# Patient Record
Sex: MALE | Race: WHITE | NOT HISPANIC OR LATINO | URBAN - METROPOLITAN AREA
[De-identification: names, ages, dates, MRNs, and addresses within clinical notes are randomized per-mention and may not be internally consistent; named-entity substitution may affect disease eponyms.]

---

## 2017-11-01 ENCOUNTER — OUTPATIENT (OUTPATIENT)
Dept: OUTPATIENT SERVICES | Facility: HOSPITAL | Age: 82
LOS: 1 days | Discharge: HOME | End: 2017-11-01
Payer: MEDICARE

## 2017-11-01 VITALS
TEMPERATURE: 98 F | DIASTOLIC BLOOD PRESSURE: 69 MMHG | HEART RATE: 67 BPM | WEIGHT: 162.92 LBS | SYSTOLIC BLOOD PRESSURE: 101 MMHG | HEIGHT: 67 IN | OXYGEN SATURATION: 96 % | RESPIRATION RATE: 18 BRPM

## 2017-11-01 DIAGNOSIS — Z01.818 ENCOUNTER FOR OTHER PREPROCEDURAL EXAMINATION: ICD-10-CM

## 2017-11-01 DIAGNOSIS — Z92.89 PERSONAL HISTORY OF OTHER MEDICAL TREATMENT: Chronic | ICD-10-CM

## 2017-11-01 DIAGNOSIS — M16.12 UNILATERAL PRIMARY OSTEOARTHRITIS, LEFT HIP: ICD-10-CM

## 2017-11-01 DIAGNOSIS — Z96.641 PRESENCE OF RIGHT ARTIFICIAL HIP JOINT: Chronic | ICD-10-CM

## 2017-11-01 DIAGNOSIS — M25.559 PAIN IN UNSPECIFIED HIP: ICD-10-CM

## 2017-11-01 LAB
ANION GAP SERPL CALC-SCNC: 9 MMOL/L — SIGNIFICANT CHANGE UP (ref 5–17)
APTT BLD: 27 SEC — LOW (ref 27.5–37.4)
BASOPHILS # BLD AUTO: 0.1 K/UL — SIGNIFICANT CHANGE UP (ref 0–0.2)
BASOPHILS NFR BLD AUTO: 1.6 % — SIGNIFICANT CHANGE UP (ref 0–2)
BUN SERPL-MCNC: 22 MG/DL — SIGNIFICANT CHANGE UP (ref 7–23)
CALCIUM SERPL-MCNC: 8.5 MG/DL — SIGNIFICANT CHANGE UP (ref 8.5–10.1)
CHLORIDE SERPL-SCNC: 108 MMOL/L — SIGNIFICANT CHANGE UP (ref 96–108)
CO2 SERPL-SCNC: 25 MMOL/L — SIGNIFICANT CHANGE UP (ref 22–31)
CREAT SERPL-MCNC: 1.09 MG/DL — SIGNIFICANT CHANGE UP (ref 0.5–1.3)
EOSINOPHIL # BLD AUTO: 0.2 K/UL — SIGNIFICANT CHANGE UP (ref 0–0.5)
EOSINOPHIL NFR BLD AUTO: 3.3 % — SIGNIFICANT CHANGE UP (ref 0–6)
GLUCOSE SERPL-MCNC: 98 MG/DL — SIGNIFICANT CHANGE UP (ref 70–99)
HBA1C BLD-MCNC: 5.8 % — HIGH (ref 4–5.6)
HCT VFR BLD CALC: 39.8 % — SIGNIFICANT CHANGE UP (ref 39–50)
HGB BLD-MCNC: 13.4 G/DL — SIGNIFICANT CHANGE UP (ref 13–17)
INR BLD: 0.93 RATIO — SIGNIFICANT CHANGE UP (ref 0.88–1.16)
LYMPHOCYTES # BLD AUTO: 0.9 K/UL — LOW (ref 1–3.3)
LYMPHOCYTES # BLD AUTO: 15.9 % — SIGNIFICANT CHANGE UP (ref 13–44)
MCHC RBC-ENTMCNC: 31.4 PG — SIGNIFICANT CHANGE UP (ref 27–34)
MCHC RBC-ENTMCNC: 33.6 GM/DL — SIGNIFICANT CHANGE UP (ref 32–36)
MCV RBC AUTO: 93.5 FL — SIGNIFICANT CHANGE UP (ref 80–100)
MONOCYTES # BLD AUTO: 0.5 K/UL — SIGNIFICANT CHANGE UP (ref 0–0.9)
MONOCYTES NFR BLD AUTO: 9.1 % — SIGNIFICANT CHANGE UP (ref 2–14)
MRSA PCR RESULT.: SIGNIFICANT CHANGE UP
NEUTROPHILS # BLD AUTO: 4.1 K/UL — SIGNIFICANT CHANGE UP (ref 1.8–7.4)
NEUTROPHILS NFR BLD AUTO: 70.2 % — SIGNIFICANT CHANGE UP (ref 43–77)
PLATELET # BLD AUTO: 238 K/UL — SIGNIFICANT CHANGE UP (ref 150–400)
POTASSIUM SERPL-MCNC: 3.7 MMOL/L — SIGNIFICANT CHANGE UP (ref 3.5–5.3)
POTASSIUM SERPL-SCNC: 3.7 MMOL/L — SIGNIFICANT CHANGE UP (ref 3.5–5.3)
PROTHROM AB SERPL-ACNC: 10.1 SEC — SIGNIFICANT CHANGE UP (ref 9.8–12.7)
RBC # BLD: 4.26 M/UL — SIGNIFICANT CHANGE UP (ref 4.2–5.8)
RBC # FLD: 14.1 % — SIGNIFICANT CHANGE UP (ref 11–15)
S AUREUS DNA NOSE QL NAA+PROBE: SIGNIFICANT CHANGE UP
SODIUM SERPL-SCNC: 142 MMOL/L — SIGNIFICANT CHANGE UP (ref 135–145)
WBC # BLD: 5.9 K/UL — SIGNIFICANT CHANGE UP (ref 3.8–10.5)
WBC # FLD AUTO: 5.9 K/UL — SIGNIFICANT CHANGE UP (ref 3.8–10.5)

## 2017-11-01 PROCEDURE — 93010 ELECTROCARDIOGRAM REPORT: CPT | Mod: NC

## 2017-11-01 NOTE — PHYSICAL THERAPY INITIAL EVALUATION ADULT - CRITERIA FOR SKILLED THERAPEUTIC INTERVENTIONS
impairments found/functional limitations in following categories/anticipated equipment needs at discharge/risk reduction/prevention/anticipated discharge recommendation/rehab potential/:CI,:CH,:CI

## 2017-11-01 NOTE — OCCUPATIONAL THERAPY INITIAL EVALUATION ADULT - MODIFIED CLINICAL TEST OF SENSORY INTEGRATION IN BALANCE TEST
Barthel Index: Feeding Score___10___, Bathing Score___5___, Grooming Score___5__, Dressing Score_10____, Bowel Score__10___, Bladder Score__10____, Toilet Score__10___, Transfer Score__15____, Mobility Score__15___, Stairs Score__10___, Total Score__100___.

## 2017-11-01 NOTE — PHYSICAL THERAPY INITIAL EVALUATION ADULT - GENERAL OBSERVATIONS, REHAB EVAL
Patient encountered sitting in [facility] waiting area, agreeable to PT pre-op evaluation. Patient is for elective left total hip arthroplasty at a later date from now.

## 2017-11-01 NOTE — PHYSICAL THERAPY INITIAL EVALUATION ADULT - PLANNED THERAPY INTERVENTIONS, PT EVAL
neuromuscular re-education/balance training/ROM/postural re-education/stretching/gait training/motor coordination training/transfer training/strengthening

## 2017-11-01 NOTE — PHYSICAL THERAPY INITIAL EVALUATION ADULT - ADDITIONAL COMMENTS
As per patient, lives c spouse in private house c 11 stair steps to enter from street, left handrail ascending. Level anahi internally. Has 3 bathrooms, all with toilets. 2 bathrooms with regular toilet seat and shower/tub combination. Master bathroom with raised toilet seat and walk-in shower. Uses a cane but owns a commode, rolling walker from previous hip surgery to right. Drives a sedan-type vehicle.

## 2017-11-01 NOTE — OCCUPATIONAL THERAPY INITIAL EVALUATION ADULT - SOCIAL CONCERNS
None/As per patient "My wife will be able to assist me after the surgery with any of my daily tasks and needs."

## 2017-11-01 NOTE — PHYSICAL THERAPY INITIAL EVALUATION ADULT - MODIFIED CLINICAL TEST OF SENSORY INTEGRATION IN BALANCE TEST
5x Sit to Stand Test = (hands use) 16 seconds, indicating significant impairment c functional mobility & strength  ; 2 Minute Walk Test = 250 feet without devices or rest stops, Lloyd RPE 5/10, measured for baseline recording

## 2017-11-01 NOTE — OCCUPATIONAL THERAPY INITIAL EVALUATION ADULT - GENERAL OBSERVATIONS, REHAB EVAL
Chart reviewed. Patient encountered seated in recliner chair in [facility] waiting area with NAD. Patient underwent occupational therapy pre-operative consultation to determine current functional ADL limitations in order to provice the right equipment for patient to perform functional ADLS post operation.

## 2017-11-01 NOTE — PHYSICAL THERAPY INITIAL EVALUATION ADULT - IMPAIRMENTS FOUND, PT EVAL
joint integrity and mobility/aerobic capacity/endurance/ergonomics and body mechanics/gait, locomotion, and balance/posture/muscle strength/ROM

## 2017-11-01 NOTE — OCCUPATIONAL THERAPY INITIAL EVALUATION ADULT - FINE MOTOR COORDINATION, FINE MOTOR COORDINATION TESTS, OT EVAL
Patient specific functional scale: Patient specific scoring scheme: 0 (unable to perform activity) ---- 5 ---- 10 (Able to perform activity at the same level as before injury or problem.)Activity: Walking ____8___

## 2017-11-01 NOTE — PHYSICAL THERAPY INITIAL EVALUATION ADULT - PERTINENT HX OF CURRENT PROBLEM, REHAB EVAL
Presents to PT for elective (L) THR. Aware of THR precautions form previous (R) THR. Noted antalgic gait. Reports gets 8/10 pain at worse on left hip when walked a while. Currently 0/10 pain at rest.

## 2017-11-01 NOTE — H&P PST ADULT - PMH
HTN (hypertension)    Hyperlipidemia    MI (myocardial infarction)  1987 HTN (hypertension)    Hyperlipidemia    Hypothyroid    MI (myocardial infarction)  1987

## 2017-11-01 NOTE — OCCUPATIONAL THERAPY INITIAL EVALUATION ADULT - ADDITIONAL COMMENTS
Patient lives in a private house with a garage entrance. Once inside of the house, the patient has 11 steps with a railing on the L side to reach the main floor where the bedroom and bathroom. The patients bathroom has a tub/shower combination with a regular toilet. The patient has a 3/1 commode and no other devices inside of the bathroom. The patient ambulates with cane for balance. The patient owns a rolling walker as well. The patient drives. The patients daily pain level is a 8/10 and the patient does not use pain medication.

## 2017-11-01 NOTE — PHYSICAL THERAPY INITIAL EVALUATION ADULT - ACTIVE RANGE OF MOTION EXAMINATION, REHAB EVAL
Right Hip Ext-Flex = 0-90, Right Hip Add-Abd = 0-20 degrees; Left Hip Ext-Flex = 0-90, Left Hip Add-Abd 0-15 degrees; left knee annamarie with right straight leg raise

## 2017-11-10 RX ORDER — CELECOXIB 200 MG/1
200 CAPSULE ORAL ONCE
Qty: 0 | Refills: 0 | Status: COMPLETED | OUTPATIENT
Start: 2017-11-13 | End: 2017-11-13

## 2017-11-10 RX ORDER — ACETAMINOPHEN 500 MG
650 TABLET ORAL ONCE
Qty: 0 | Refills: 0 | Status: COMPLETED | OUTPATIENT
Start: 2017-11-13 | End: 2017-11-13

## 2017-11-10 RX ORDER — SODIUM CHLORIDE 9 MG/ML
3 INJECTION INTRAMUSCULAR; INTRAVENOUS; SUBCUTANEOUS EVERY 8 HOURS
Qty: 0 | Refills: 0 | Status: DISCONTINUED | OUTPATIENT
Start: 2017-11-13 | End: 2017-11-14

## 2017-11-13 ENCOUNTER — INPATIENT (INPATIENT)
Facility: HOSPITAL | Age: 82
LOS: 0 days | Discharge: HOME | End: 2017-11-14
Attending: ORTHOPAEDIC SURGERY | Admitting: ORTHOPAEDIC SURGERY
Payer: MEDICARE

## 2017-11-13 VITALS
SYSTOLIC BLOOD PRESSURE: 119 MMHG | OXYGEN SATURATION: 97 % | RESPIRATION RATE: 17 BRPM | HEART RATE: 62 BPM | TEMPERATURE: 97 F | DIASTOLIC BLOOD PRESSURE: 72 MMHG | HEIGHT: 67 IN | WEIGHT: 154.98 LBS

## 2017-11-13 DIAGNOSIS — E03.9 HYPOTHYROIDISM, UNSPECIFIED: ICD-10-CM

## 2017-11-13 DIAGNOSIS — M16.12 UNILATERAL PRIMARY OSTEOARTHRITIS, LEFT HIP: ICD-10-CM

## 2017-11-13 DIAGNOSIS — I10 ESSENTIAL (PRIMARY) HYPERTENSION: ICD-10-CM

## 2017-11-13 DIAGNOSIS — Z96.641 PRESENCE OF RIGHT ARTIFICIAL HIP JOINT: Chronic | ICD-10-CM

## 2017-11-13 DIAGNOSIS — Z92.89 PERSONAL HISTORY OF OTHER MEDICAL TREATMENT: Chronic | ICD-10-CM

## 2017-11-13 DIAGNOSIS — I25.10 ATHEROSCLEROTIC HEART DISEASE OF NATIVE CORONARY ARTERY WITHOUT ANGINA PECTORIS: ICD-10-CM

## 2017-11-13 LAB
ANION GAP SERPL CALC-SCNC: 8 MMOL/L — SIGNIFICANT CHANGE UP (ref 5–17)
APTT BLD: 26.7 SEC — LOW (ref 27.5–37.4)
BUN SERPL-MCNC: 16 MG/DL — SIGNIFICANT CHANGE UP (ref 7–23)
CALCIUM SERPL-MCNC: 8.1 MG/DL — LOW (ref 8.5–10.1)
CHLORIDE SERPL-SCNC: 108 MMOL/L — SIGNIFICANT CHANGE UP (ref 96–108)
CK MB BLD-MCNC: 1.8 % — SIGNIFICANT CHANGE UP (ref 0–3.5)
CK MB CFR SERPL CALC: 5.2 NG/ML — HIGH (ref 0.5–3.6)
CK SERPL-CCNC: 290 U/L — SIGNIFICANT CHANGE UP (ref 26–308)
CO2 SERPL-SCNC: 24 MMOL/L — SIGNIFICANT CHANGE UP (ref 22–31)
CREAT SERPL-MCNC: 0.87 MG/DL — SIGNIFICANT CHANGE UP (ref 0.5–1.3)
GLUCOSE SERPL-MCNC: 88 MG/DL — SIGNIFICANT CHANGE UP (ref 70–99)
HCT VFR BLD CALC: 36.8 % — LOW (ref 39–50)
HGB BLD-MCNC: 12.2 G/DL — LOW (ref 13–17)
INR BLD: 1.04 RATIO — SIGNIFICANT CHANGE UP (ref 0.88–1.16)
MAGNESIUM SERPL-MCNC: 2.2 MG/DL — SIGNIFICANT CHANGE UP (ref 1.6–2.6)
MCHC RBC-ENTMCNC: 31.1 PG — SIGNIFICANT CHANGE UP (ref 27–34)
MCHC RBC-ENTMCNC: 33.3 GM/DL — SIGNIFICANT CHANGE UP (ref 32–36)
MCV RBC AUTO: 93.4 FL — SIGNIFICANT CHANGE UP (ref 80–100)
PHOSPHATE SERPL-MCNC: 2.8 MG/DL — SIGNIFICANT CHANGE UP (ref 2.5–4.5)
PLATELET # BLD AUTO: 273 K/UL — SIGNIFICANT CHANGE UP (ref 150–400)
POTASSIUM SERPL-MCNC: 3.9 MMOL/L — SIGNIFICANT CHANGE UP (ref 3.5–5.3)
POTASSIUM SERPL-SCNC: 3.9 MMOL/L — SIGNIFICANT CHANGE UP (ref 3.5–5.3)
PROTHROM AB SERPL-ACNC: 11.4 SEC — SIGNIFICANT CHANGE UP (ref 9.8–12.7)
RBC # BLD: 3.94 M/UL — LOW (ref 4.2–5.8)
RBC # FLD: 13.7 % — SIGNIFICANT CHANGE UP (ref 11–15)
SODIUM SERPL-SCNC: 140 MMOL/L — SIGNIFICANT CHANGE UP (ref 135–145)
TROPONIN I SERPL-MCNC: <.015 NG/ML — SIGNIFICANT CHANGE UP (ref 0.01–0.04)
WBC # BLD: 8.3 K/UL — SIGNIFICANT CHANGE UP (ref 3.8–10.5)
WBC # FLD AUTO: 8.3 K/UL — SIGNIFICANT CHANGE UP (ref 3.8–10.5)

## 2017-11-13 PROCEDURE — 73502 X-RAY EXAM HIP UNI 2-3 VIEWS: CPT | Mod: 26,LT

## 2017-11-13 PROCEDURE — 99233 SBSQ HOSP IP/OBS HIGH 50: CPT

## 2017-11-13 PROCEDURE — 88305 TISSUE EXAM BY PATHOLOGIST: CPT | Mod: 26

## 2017-11-13 PROCEDURE — 88311 DECALCIFY TISSUE: CPT | Mod: 26

## 2017-11-13 RX ORDER — AMLODIPINE BESYLATE 2.5 MG/1
1 TABLET ORAL
Qty: 0 | Refills: 0 | COMMUNITY

## 2017-11-13 RX ORDER — ONDANSETRON 8 MG/1
4 TABLET, FILM COATED ORAL EVERY 6 HOURS
Qty: 0 | Refills: 0 | Status: DISCONTINUED | OUTPATIENT
Start: 2017-11-13 | End: 2017-11-14

## 2017-11-13 RX ORDER — RIVAROXABAN 15 MG-20MG
10 KIT ORAL DAILY
Qty: 0 | Refills: 0 | Status: DISCONTINUED | OUTPATIENT
Start: 2017-11-14 | End: 2017-11-14

## 2017-11-13 RX ORDER — CEFAZOLIN SODIUM 1 G
2000 VIAL (EA) INJECTION EVERY 8 HOURS
Qty: 0 | Refills: 0 | Status: COMPLETED | OUTPATIENT
Start: 2017-11-13 | End: 2017-11-14

## 2017-11-13 RX ORDER — DEXAMETHASONE 0.5 MG/5ML
10 ELIXIR ORAL ONCE
Qty: 0 | Refills: 0 | Status: COMPLETED | OUTPATIENT
Start: 2017-11-14 | End: 2017-11-14

## 2017-11-13 RX ORDER — METOPROLOL TARTRATE 50 MG
1 TABLET ORAL
Qty: 0 | Refills: 0 | COMMUNITY

## 2017-11-13 RX ORDER — FENTANYL CITRATE 50 UG/ML
25 INJECTION INTRAVENOUS
Qty: 0 | Refills: 0 | Status: DISCONTINUED | OUTPATIENT
Start: 2017-11-13 | End: 2017-11-13

## 2017-11-13 RX ORDER — OXYCODONE HYDROCHLORIDE 5 MG/1
10 TABLET ORAL EVERY 4 HOURS
Qty: 0 | Refills: 0 | Status: DISCONTINUED | OUTPATIENT
Start: 2017-11-13 | End: 2017-11-14

## 2017-11-13 RX ORDER — OXYCODONE HYDROCHLORIDE 5 MG/1
5 TABLET ORAL EVERY 4 HOURS
Qty: 0 | Refills: 0 | Status: DISCONTINUED | OUTPATIENT
Start: 2017-11-13 | End: 2017-11-14

## 2017-11-13 RX ORDER — DOCUSATE SODIUM 100 MG
100 CAPSULE ORAL THREE TIMES A DAY
Qty: 0 | Refills: 0 | Status: DISCONTINUED | OUTPATIENT
Start: 2017-11-13 | End: 2017-11-14

## 2017-11-13 RX ORDER — AMLODIPINE BESYLATE 2.5 MG/1
5 TABLET ORAL AT BEDTIME
Qty: 0 | Refills: 0 | Status: DISCONTINUED | OUTPATIENT
Start: 2017-11-13 | End: 2017-11-14

## 2017-11-13 RX ORDER — SODIUM CHLORIDE 9 MG/ML
1000 INJECTION, SOLUTION INTRAVENOUS
Qty: 0 | Refills: 0 | Status: DISCONTINUED | OUTPATIENT
Start: 2017-11-13 | End: 2017-11-14

## 2017-11-13 RX ORDER — ONDANSETRON 8 MG/1
4 TABLET, FILM COATED ORAL ONCE
Qty: 0 | Refills: 0 | Status: DISCONTINUED | OUTPATIENT
Start: 2017-11-13 | End: 2017-11-13

## 2017-11-13 RX ORDER — FENTANYL CITRATE 50 UG/ML
50 INJECTION INTRAVENOUS
Qty: 0 | Refills: 0 | Status: DISCONTINUED | OUTPATIENT
Start: 2017-11-13 | End: 2017-11-13

## 2017-11-13 RX ORDER — RIVAROXABAN 15 MG-20MG
1 KIT ORAL
Qty: 21 | Refills: 0 | OUTPATIENT
Start: 2017-11-13

## 2017-11-13 RX ORDER — MORPHINE SULFATE 50 MG/1
2 CAPSULE, EXTENDED RELEASE ORAL
Qty: 0 | Refills: 0 | Status: DISCONTINUED | OUTPATIENT
Start: 2017-11-13 | End: 2017-11-14

## 2017-11-13 RX ORDER — SENNA PLUS 8.6 MG/1
2 TABLET ORAL AT BEDTIME
Qty: 0 | Refills: 0 | Status: DISCONTINUED | OUTPATIENT
Start: 2017-11-13 | End: 2017-11-14

## 2017-11-13 RX ORDER — EZETIMIBE AND SIMVASTATIN 10; 80 MG/1; MG/1
1 TABLET, FILM COATED ORAL
Qty: 0 | Refills: 0 | COMMUNITY

## 2017-11-13 RX ORDER — SODIUM CHLORIDE 9 MG/ML
1000 INJECTION, SOLUTION INTRAVENOUS
Qty: 0 | Refills: 0 | Status: DISCONTINUED | OUTPATIENT
Start: 2017-11-13 | End: 2017-11-13

## 2017-11-13 RX ORDER — METOPROLOL TARTRATE 50 MG
50 TABLET ORAL
Qty: 0 | Refills: 0 | Status: DISCONTINUED | OUTPATIENT
Start: 2017-11-13 | End: 2017-11-14

## 2017-11-13 RX ORDER — SIMVASTATIN 20 MG/1
40 TABLET, FILM COATED ORAL AT BEDTIME
Qty: 0 | Refills: 0 | Status: DISCONTINUED | OUTPATIENT
Start: 2017-11-13 | End: 2017-11-14

## 2017-11-13 RX ORDER — ACETAMINOPHEN 500 MG
650 TABLET ORAL EVERY 6 HOURS
Qty: 0 | Refills: 0 | Status: DISCONTINUED | OUTPATIENT
Start: 2017-11-13 | End: 2017-11-14

## 2017-11-13 RX ORDER — TRANEXAMIC ACID 100 MG/ML
1000 INJECTION, SOLUTION INTRAVENOUS ONCE
Qty: 0 | Refills: 0 | Status: COMPLETED | OUTPATIENT
Start: 2017-11-13 | End: 2017-11-13

## 2017-11-13 RX ADMIN — Medication 650 MILLIGRAM(S): at 13:20

## 2017-11-13 RX ADMIN — CELECOXIB 200 MILLIGRAM(S): 200 CAPSULE ORAL at 13:21

## 2017-11-13 RX ADMIN — TRANEXAMIC ACID 220 MILLIGRAM(S): 100 INJECTION, SOLUTION INTRAVENOUS at 19:09

## 2017-11-13 RX ADMIN — Medication 100 MILLIGRAM(S): at 22:48

## 2017-11-13 RX ADMIN — SODIUM CHLORIDE 75 MILLILITER(S): 9 INJECTION, SOLUTION INTRAVENOUS at 17:15

## 2017-11-13 RX ADMIN — Medication 100 MILLIGRAM(S): at 23:55

## 2017-11-13 RX ADMIN — SIMVASTATIN 40 MILLIGRAM(S): 20 TABLET, FILM COATED ORAL at 22:48

## 2017-11-13 NOTE — PHYSICAL THERAPY INITIAL EVALUATION ADULT - CRITERIA FOR SKILLED THERAPEUTIC INTERVENTIONS
functional limitations in following categories/anticipated discharge recommendation/risk reduction/prevention/impairments found

## 2017-11-13 NOTE — PROGRESS NOTE ADULT - PROBLEM SELECTOR PLAN 3
?ST depressions in OR on monitor, ekg, check Ralph, cards consult, keep on tele monitor, pt. denies chest pain and or discomfort.

## 2017-11-13 NOTE — PROGRESS NOTE ADULT - PROBLEM SELECTOR PLAN 1
s/p left THR (POD 0)  management as per ortho  pain management and bowel regimen  zofran for N/V  incentive spirometer  monitor H/H  OT/PT

## 2017-11-13 NOTE — PROGRESS NOTE ADULT - SUBJECTIVE AND OBJECTIVE BOX
INTERVAL HPI/OVERNIGHT EVENTS:  Pt. seen and examined at bedside s/p LEFT JOSE ALEJANDRO. Pt. with no complaints at this time, states he is doing well. Denies hip pain. Just was seen by PT, ambulated, no stairs yet. Eating reg. food, tolerating diet without N/V. Has not voided postop yet. Corby numbness/tingling or weakness to extremity.   Denies f/c, cp or chest discomfort, palpitations, sob, diaphoresis, cough, abdominal pain.     Vital Signs Last 24 Hrs  T(C): 36.6 (13 Nov 2017 21:19), Max: 36.7 (13 Nov 2017 20:27)  T(F): 97.8 (13 Nov 2017 21:19), Max: 98 (13 Nov 2017 20:27)  HR: 107 (13 Nov 2017 21:19) (62 - 107)  BP: 109/64 (13 Nov 2017 21:19) (106/76 - 135/68)  RR: 18 (13 Nov 2017 21:19) (12 - 23)  SpO2: 95% (13 Nov 2017 21:19) (95% - 98%)    PHYSICAL EXAM:    GENERAL: NAD  CHEST/LUNG: Clear to ausculation, bilaterally   HEART: S1S2, slightly tachycardic, reg.  no murmur noted  ABDOMEN: non distended, soft, non tender.  EXTREMITIES: Left hip TERRI dressing C/D/I and functioning well; calf soft, non tender b/l. +SILT hip/thigh/leg/foot b/l. +DP/PT pulses b/l. +flex/ext knee. +dorsiflex/plantar flex ankle. Strength 5/5 b/l.     I&O's Detail    13 Nov 2017 07:01  -  13 Nov 2017 21:21  --------------------------------------------------------  IN:    lactated ringers.: 225 mL  Total IN: 225 mL    OUT:  Total OUT: 0 mL    Total NET: 225 mL    LABS:                        12.2   8.3   )-----------( 273      ( 13 Nov 2017 17:59 )             36.8     11-13    140  |  108  |  16  ----------------------------<  88  3.9   |  24  |  0.87    Ca    8.1<L>      13 Nov 2017 17:59  Phos  2.8     11-13  Mg     2.2     11-13    PT/INR - ( 13 Nov 2017 17:59 )   PT: 11.4 sec;   INR: 1.04 ratio    PTT - ( 13 Nov 2017 17:59 )  PTT:26.7 sec    RADIOLOGY & ADDITIONAL STUDIES:  < from: Xray Hip 2-3 Views, Left (11.13.17 @ 17:25) >  Impression:    Bilateral hip prostheses.    < end of copied text >    Impression: 80yo Male s/p Left JOSE ALEJANDRO POD 0 with ST depressions seen intraoperatively, postop stable with no cp or symptoms. Troponin x1 NEG.     Plan:  -Cont. reg. diet as tolerated, D/C IVF if beverly. adequate PO intake  -Cont. tele monitoring, F/u Cardiology consult, trend Cardiac enzymes  -pain management prn  -Ancef x2 doses  -DVT prophylaxis: Xarelto and IPCs  -PT, OOB, Ambulating, encourage incentive spirometer  -continue local wound care, TERRI dressing care-- to be changed upon discharge  -Continue medical management/supportive care  -Appreciate medical follow up  -f/u AM labs  -will discuss pt. with surgical attending

## 2017-11-13 NOTE — BRIEF OPERATIVE NOTE - PROCEDURE
<<-----Click on this checkbox to enter Procedure Total hip arthroplasty  11/13/2017    Active  DZALDIVAR

## 2017-11-13 NOTE — PROGRESS NOTE ADULT - ASSESSMENT
82 yo M HTN, HLD, hypothyroid, s/p remote MI ('87) s/p LEFT THR., ST depressions on cardiac monitor in OR

## 2017-11-13 NOTE — PROGRESS NOTE ADULT - SUBJECTIVE AND OBJECTIVE BOX
HPI: 82 yo M HTN, HLD, hypothyroid, s/p remote MI ('87) s/p LEFT THR. Reportedly had ST depressions on monitor in OR. Pt. seen in PACU, feels well, no pain, no chest pain, no sob, no n/v.      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 No nausea, vomiting, or hematemesis; No diarrhea or constipation.   GENITOURINARY: No dysuria, frequency, hematuria, or incontinence  NEUROLOGICAL: No headaches, memory loss, loss of strength, numbness, or tremors  SKIN: No itching, burning, rashes, or lesions   LYMPH NODES: No enlarged glands  ENDOCRINE: No heat or cold intolerance; No hair loss  MUSCULOSKELETAL: No joint pain or swelling; No muscle, back, or extremity pain        MEDICATIONS  (STANDING):  amLODIPine   Tablet 5 milliGRAM(s) Oral at bedtime  BUpivacaine liposome 1.3% injectable (Rx Quick Charge) 20 milliLiter(s) Local Injection   ceFAZolin   IVPB 2000 milliGRAM(s) IV Intermittent every 8 hours  docusate sodium 100 milliGRAM(s) Oral three times a day  lactated ringers. 1000 milliLiter(s) (75 mL/Hr) IV Continuous <Continuous>  lactated ringers. 1000 milliLiter(s) (100 mL/Hr) IV Continuous <Continuous>  metoprolol     tartrate 50 milliGRAM(s) Oral two times a day  simvastatin 40 milliGRAM(s) Oral at bedtime  tranexamic acid IVPB 1000 milliGRAM(s) IV Intermittent once    MEDICATIONS  (PRN):  acetaminophen   Tablet 650 milliGRAM(s) Oral every 6 hours PRN For Temp over 38.3 C (100.94 F)  aluminum hydroxide/magnesium hydroxide/simethicone Suspension 30 milliLiter(s) Oral four times a day PRN Indigestion  fentaNYL    Injectable 25 MICROGram(s) IV Push every 5 minutes PRN Moderate Pain  fentaNYL    Injectable 50 MICROGram(s) IV Push every 5 minutes PRN Severe Pain  morphine  - Injectable 2 milliGRAM(s) IV Push every 3 hours PRN Severe Pain  ondansetron Injectable 4 milliGRAM(s) IV Push every 6 hours PRN Nausea and/or Vomiting  ondansetron Injectable 4 milliGRAM(s) IV Push once PRN Nausea and/or Vomiting  oxyCODONE    IR 5 milliGRAM(s) Oral every 4 hours PRN Mild Pain  oxyCODONE    IR 10 milliGRAM(s) Oral every 4 hours PRN Moderate Pain  senna 2 Tablet(s) Oral at bedtime PRN Constipation      Allergies    penicillin (Stomach Upset; Rash)    Intolerances        SOCIAL HISTORY:  , lives with spouse, no EtOH, no smoking  FAMILY HISTORY:  non-contributory    Vital Signs Last 24 Hrs  T(C): 36.4 (13 Nov 2017 17:33), Max: 36.4 (13 Nov 2017 17:33)  T(F): 97.5 (13 Nov 2017 17:33), Max: 97.5 (13 Nov 2017 17:33)  HR: 91 (13 Nov 2017 18:03) (62 - 92)  BP: 123/77 (13 Nov 2017 18:30) (106/76 - 123/77)  BP(mean): --  RR: 17 (13 Nov 2017 18:03) (12 - 23)  SpO2: 98% (13 Nov 2017 18:03) (95% - 98%)    PHYSICAL EXAM:    GENERAL: NAD, awake and alert  HEAD:  Atraumatic, Normocephalic  EYES: EOMI, PERRLA, conjunctiva and sclera clear  NECK: Supple, No JVD, Normal thyroid  NERVOUS SYSTEM:  Alert & Oriented X3, Good concentration; Motor Strength 5/5 B/L upper and lower extremities; DTRs 2+ intact and symmetric  CHEST/LUNG: Clear to percussion bilaterally; No rales, rhonchi, wheezing, or rubs  HEART: Regular rate and rhythm; No murmurs, rubs, or gallops  ABDOMEN: Soft, Nontender, Nondistended; Bowel sounds present  EXTREMITIES:  2+ Peripheral Pulses, No clubbing, cyanosis, or edema, left hip dressing c/d/i          LABS:                        12.2   8.3   )-----------( 273      ( 13 Nov 2017 17:59 )             36.8     11-13    140  |  108  |  16  ----------------------------<  88  3.9   |  24  |  0.87    Ca    8.1<L>      13 Nov 2017 17:59  Phos  2.8     11-13  Mg     2.2     11-13      PT/INR - ( 13 Nov 2017 17:59 )   PT: 11.4 sec;   INR: 1.04 ratio         PTT - ( 13 Nov 2017 17:59 )  PTT:26.7 sec      RADIOLOGY & ADDITIONAL STUDIES:  < from: Xray Hip 2-3 Views, Left (11.13.17 @ 17:25) >    EXAM:  XR HIP 2-3V LT                            PROCEDURE DATE:  11/13/2017          INTERPRETATION:  Clinical information: Left total hip revision.    Single AP view of the pelvis was obtained. There is no comparison   examination.    There are bilateral total hip prostheses. There is no obvious fracture or   dislocation. Soft tissues are unremarkable.    Impression:    Bilateral hip prostheses.    < end of copied text >

## 2017-11-14 VITALS
RESPIRATION RATE: 16 BRPM | TEMPERATURE: 98 F | OXYGEN SATURATION: 96 % | HEART RATE: 99 BPM | SYSTOLIC BLOOD PRESSURE: 125 MMHG | DIASTOLIC BLOOD PRESSURE: 75 MMHG

## 2017-11-14 DIAGNOSIS — D50.0 IRON DEFICIENCY ANEMIA SECONDARY TO BLOOD LOSS (CHRONIC): ICD-10-CM

## 2017-11-14 LAB
ANION GAP SERPL CALC-SCNC: 11 MMOL/L — SIGNIFICANT CHANGE UP (ref 5–17)
BUN SERPL-MCNC: 19 MG/DL — SIGNIFICANT CHANGE UP (ref 7–23)
CALCIUM SERPL-MCNC: 7.9 MG/DL — LOW (ref 8.5–10.1)
CHLORIDE SERPL-SCNC: 103 MMOL/L — SIGNIFICANT CHANGE UP (ref 96–108)
CK MB BLD-MCNC: 1.2 % — SIGNIFICANT CHANGE UP (ref 0–3.5)
CK MB BLD-MCNC: 1.4 % — SIGNIFICANT CHANGE UP (ref 0–3.5)
CK MB CFR SERPL CALC: 6.5 NG/ML — HIGH (ref 0.5–3.6)
CK MB CFR SERPL CALC: 6.8 NG/ML — HIGH (ref 0.5–3.6)
CK SERPL-CCNC: 450 U/L — HIGH (ref 26–308)
CK SERPL-CCNC: 553 U/L — HIGH (ref 26–308)
CO2 SERPL-SCNC: 22 MMOL/L — SIGNIFICANT CHANGE UP (ref 22–31)
CREAT SERPL-MCNC: 1.07 MG/DL — SIGNIFICANT CHANGE UP (ref 0.5–1.3)
GLUCOSE SERPL-MCNC: 162 MG/DL — HIGH (ref 70–99)
HCT VFR BLD CALC: 30 % — LOW (ref 39–50)
HGB BLD-MCNC: 10 G/DL — LOW (ref 13–17)
MAGNESIUM SERPL-MCNC: 2 MG/DL — SIGNIFICANT CHANGE UP (ref 1.6–2.6)
MCHC RBC-ENTMCNC: 31.1 PG — SIGNIFICANT CHANGE UP (ref 27–34)
MCHC RBC-ENTMCNC: 33.2 GM/DL — SIGNIFICANT CHANGE UP (ref 32–36)
MCV RBC AUTO: 93.6 FL — SIGNIFICANT CHANGE UP (ref 80–100)
PHOSPHATE SERPL-MCNC: 2 MG/DL — LOW (ref 2.5–4.5)
PLATELET # BLD AUTO: 232 K/UL — SIGNIFICANT CHANGE UP (ref 150–400)
POTASSIUM SERPL-MCNC: 4.2 MMOL/L — SIGNIFICANT CHANGE UP (ref 3.5–5.3)
POTASSIUM SERPL-SCNC: 4.2 MMOL/L — SIGNIFICANT CHANGE UP (ref 3.5–5.3)
RBC # BLD: 3.2 M/UL — LOW (ref 4.2–5.8)
RBC # FLD: 13 % — SIGNIFICANT CHANGE UP (ref 11–15)
SODIUM SERPL-SCNC: 136 MMOL/L — SIGNIFICANT CHANGE UP (ref 135–145)
TROPONIN I SERPL-MCNC: 0.02 NG/ML — SIGNIFICANT CHANGE UP (ref 0.01–0.04)
TROPONIN I SERPL-MCNC: 0.02 NG/ML — SIGNIFICANT CHANGE UP (ref 0.01–0.04)
WBC # BLD: 10.2 K/UL — SIGNIFICANT CHANGE UP (ref 3.8–10.5)
WBC # FLD AUTO: 10.2 K/UL — SIGNIFICANT CHANGE UP (ref 3.8–10.5)

## 2017-11-14 PROCEDURE — 99223 1ST HOSP IP/OBS HIGH 75: CPT

## 2017-11-14 PROCEDURE — 99233 SBSQ HOSP IP/OBS HIGH 50: CPT

## 2017-11-14 RX ORDER — LEVOTHYROXINE SODIUM 125 MCG
1 TABLET ORAL
Qty: 0 | Refills: 0 | COMMUNITY

## 2017-11-14 RX ORDER — DOCUSATE SODIUM 100 MG
1 CAPSULE ORAL
Qty: 20 | Refills: 0 | OUTPATIENT
Start: 2017-11-14

## 2017-11-14 RX ORDER — SODIUM,POTASSIUM PHOSPHATES 278-250MG
1 POWDER IN PACKET (EA) ORAL
Qty: 0 | Refills: 0 | Status: DISCONTINUED | OUTPATIENT
Start: 2017-11-14 | End: 2017-11-14

## 2017-11-14 RX ORDER — ASPIRIN/CALCIUM CARB/MAGNESIUM 324 MG
1 TABLET ORAL
Qty: 0 | Refills: 0 | COMMUNITY

## 2017-11-14 RX ORDER — LEVOTHYROXINE SODIUM 125 MCG
50 TABLET ORAL
Qty: 0 | Refills: 0 | COMMUNITY

## 2017-11-14 RX ORDER — BENZOCAINE AND MENTHOL 5; 1 G/100ML; G/100ML
1 LIQUID ORAL
Qty: 0 | Refills: 0 | Status: DISCONTINUED | OUTPATIENT
Start: 2017-11-14 | End: 2017-11-14

## 2017-11-14 RX ORDER — LEVOTHYROXINE SODIUM 125 MCG
50 TABLET ORAL DAILY
Qty: 0 | Refills: 0 | Status: DISCONTINUED | OUTPATIENT
Start: 2017-11-14 | End: 2017-11-14

## 2017-11-14 RX ADMIN — RIVAROXABAN 10 MILLIGRAM(S): KIT at 12:26

## 2017-11-14 RX ADMIN — Medication 50 MICROGRAM(S): at 07:08

## 2017-11-14 RX ADMIN — Medication 100 MILLIGRAM(S): at 09:30

## 2017-11-14 RX ADMIN — Medication 102 MILLIGRAM(S): at 03:28

## 2017-11-14 RX ADMIN — SODIUM CHLORIDE 3 MILLILITER(S): 9 INJECTION INTRAMUSCULAR; INTRAVENOUS; SUBCUTANEOUS at 13:21

## 2017-11-14 RX ADMIN — BENZOCAINE AND MENTHOL 1 LOZENGE: 5; 1 LIQUID ORAL at 09:49

## 2017-11-14 RX ADMIN — Medication 100 MILLIGRAM(S): at 05:38

## 2017-11-14 RX ADMIN — Medication 100 MILLIGRAM(S): at 13:22

## 2017-11-14 RX ADMIN — Medication 1 TABLET(S): at 12:26

## 2017-11-14 NOTE — OCCUPATIONAL THERAPY INITIAL EVALUATION ADULT - TRANSFER SAFETY CONCERNS NOTED: BED/CHAIR, REHAB EVAL
decreased step length/squat pivot/decreased balance during turns/decreased proprioception/decreased weight-shifting ability/decreased safety awareness/decreased sequencing ability/stand pivot

## 2017-11-14 NOTE — DISCHARGE NOTE ADULT - PATIENT PORTAL LINK FT
“You can access the Follow[employer] Patient Portal, offered by [facility], by registering with the following website: [email or web address]”

## 2017-11-14 NOTE — DISCHARGE NOTE ADULT - PLAN OF CARE
recover from surgery Please call the office of Dr. Lyons to make an appointment for follow up in 7-10 days. Keep dressing clean and dry. TERRI dressing to be removed in office on follow up. May use stool softener for constipation. Please use prescribed pain medication as directed, and do not drive while using narcotic pain medication. Cont. xarelto 10mg daily. Please call MD if any issues. cont. home med and follow up with PMD cont. home meds and follow up with PMD cont. home med, follow up with PMD

## 2017-11-14 NOTE — OCCUPATIONAL THERAPY INITIAL EVALUATION ADULT - GENERAL OBSERVATIONS, REHAB EVAL
Pt was seen for initial OT consult encountered OOB to chair on cardiac monitor. Pt was AA&Ox4, cooperative & followed commands. THP were reviewed & maintained. Pt presents with left hip  pain due to s/p THR.

## 2017-11-14 NOTE — PROGRESS NOTE ADULT - ASSESSMENT
80 yo M HTN, HLD, hypothyroid, s/p remote MI ('87) s/p LEFT THR., ST depressions on cardiac monitor in OR

## 2017-11-14 NOTE — PROGRESS NOTE ADULT - PROBLEM SELECTOR PLAN 3
ST depressions on monitor in OR  trops negative, appreciate cards input, cleared for discharge to fu with his cardilogist.

## 2017-11-14 NOTE — DISCHARGE NOTE ADULT - NS AS ACTIVITY OBS
Walking-Indoors allowed/Walking-Outdoors allowed/Stairs allowed/No Heavy lifting/straining/Do not drive or operate machinery

## 2017-11-14 NOTE — OCCUPATIONAL THERAPY INITIAL EVALUATION ADULT - PRECAUTIONS/LIMITATIONS, REHAB EVAL
diminished reaction time due to age related changes/cardiac precautions/fall precautions/left hip precautions

## 2017-11-14 NOTE — DISCHARGE NOTE ADULT - HOSPITAL COURSE
Patient admitted for elective left total hip arthroplasty.  There were no acute events intraoperatively.  Patient tolerated the procedure well.  Postop, patient did well.  Tolerated advancing diet, postop pain, PT ambulation.  Home Care was set up.  Patient was discharged stable.

## 2017-11-14 NOTE — OCCUPATIONAL THERAPY INITIAL EVALUATION ADULT - PLANNED THERAPY INTERVENTIONS, OT EVAL
Vital Signs Last 24 Hrs  T(C): 37.1 (11 Aug 2017 23:22), Max: 37.2 (11 Aug 2017 18:56)  T(F): 98.8 (11 Aug 2017 23:22), Max: 99 (11 Aug 2017 18:56)  HR: 91 (11 Aug 2017 23:22) (91 - 96)  BP: 92/69 (11 Aug 2017 23:22) (92/69 - 107/75)  BP(mean): --  RR: 16 (11 Aug 2017 23:22) (16 - 20)  SpO2: 98% (11 Aug 2017 23:22) (98% - 99%)    PHYSICAL EXAM:  GENERAL: NAD,no accessory muscle use,  HEAD:  Atraumatic, Normocephalic  EYES: EOMI, PERRLA, conjunctiva and sclera clear  ENMT: bipap in place  NECK: Supple, No JVD, Normal thyroid  NERVOUS SYSTEM:  Alert unable to participate w/exam  CHEST/LUNG: cta bl, no wheezing, or rubs  HEART: Regular rate and rhythm; No rubs, or gallops, +S1,S2  ABDOMEN: Soft, Nontender, Nondistended; Bowel sounds present, peg site clean  EXTREMITIES:  2+ Peripheral Pulses, No clubbing, cyanosis, or edema  LYMPH: No cervical adenopathy  RECTAL: deferred  BREAST: No palpable masses, skin no lesions   : deferred  SKIN: No rashes or lesions
transfer training/ADL retraining/IADL retraining/bed mobility training/parent/caregiver training.../strengthening/stretching/energy conservation techniques/fine motor coordination training/joint mobilization/neuromuscular re-education/ROM

## 2017-11-14 NOTE — DISCHARGE NOTE ADULT - ADDITIONAL INSTRUCTIONS
Please notify physician sooner or return to the ER with worsening or recurrence of symptoms, if any chest pain, shortness of breath, palpitations, abdominal pain, nausea/vomiting, fever>101, unable to tolerate food/drink, inability to void, bleeding or swelling, new weakness, numbness/tingling to extremity or other concerns/problems.

## 2017-11-14 NOTE — OCCUPATIONAL THERAPY INITIAL EVALUATION ADULT - PERSONAL SAFETY AND JUDGMENT, REHAB EVAL
intact/Pt needs reminder for proper hand and foot placement  during transfer  and dressing tasks  and to safely adhere to posterior total  hip  replacement/at risk behaviors demonstrated

## 2017-11-14 NOTE — OCCUPATIONAL THERAPY INITIAL EVALUATION ADULT - RANGE OF MOTION EXAMINATION, LOWER EXTREMITY
Right LE Active ROM was WNL(within normal limits)/Right LE Passive ROM was WNL (within normal limits)/AOM  in  left hip is 0-45 degrees with  pain

## 2017-11-14 NOTE — PROGRESS NOTE ADULT - SUBJECTIVE AND OBJECTIVE BOX
INTERVAL HPI/OVERNIGHT EVENTS:  Pt. seen and examined at bedside. Pt. lying in bed, resting comfortably. Denies pain or complaints. States he is doing well. Denies any chest discomfort of pain. No acute overnight event. Tolerating diet, no N/V. No Flatus or BM yet. Voided postop. Ambulated with PT.   Denies f/c, cp, sob, palpitations, dizziness, cough, dysuria, abdominal pain, N/V, new weakness or numbness/tingling to extremity.     Vital Signs Last 24 Hrs  T(C): 36.2 (14 Nov 2017 03:05), Max: 36.7 (13 Nov 2017 20:27)  T(F): 97.2 (14 Nov 2017 03:05), Max: 98 (13 Nov 2017 20:27)  HR: 90 (14 Nov 2017 03:05) (62 - 107)  BP: 93/40 (14 Nov 2017 03:05) (93/40 - 135/68)  RR: 16 (14 Nov 2017 03:05) (12 - 23)  SpO2: 94% (14 Nov 2017 03:05) (94% - 98%)    PHYSICAL EXAM:    GENERAL: NAD  CHEST/LUNG: Clear to ausculation, bilaterally   HEART: +S1S2, RRR.  ABDOMEN: non distended, soft, non tender.  EXTREMITIES: Left hip TERRI dressing C/D/I and functioning well; calf soft, non tender b/l. +SILT hip/thigh/leg/foot b/l. +DP/PT pulses b/l. +flex/ext knee. +dorsiflex/plantar flex ankle. +EHL, +FHL.  Strength 5/5 b/l.       I&O's Detail    13 Nov 2017 07:01  -  14 Nov 2017 05:29  --------------------------------------------------------  IN:    IV PiggyBack: 50 mL    lactated ringers.: 1000 mL    lactated ringers.: 225 mL  Total IN: 1275 mL    OUT:    Voided: 400 mL  Total OUT: 400 mL    Total NET: 875 mL      LABS:                        12.2   8.3   )-----------( 273      ( 13 Nov 2017 17:59 )             36.8     11-13    140  |  108  |  16  ----------------------------<  88  3.9   |  24  |  0.87    Ca    8.1<L>      13 Nov 2017 17:59  Phos  2.8     11-13  Mg     2.2     11-13    PT/INR - ( 13 Nov 2017 17:59 )   PT: 11.4 sec;   INR: 1.04 ratio    PTT - ( 13 Nov 2017 17:59 )  PTT:26.7 sec    Impression: 82yo Male s/p Left JOSE ALEJANDRO POD#1, seen with ST depressions intraoperatively. Postop stable, no cp or symptoms. Troponin x2 NEG.     Plan:  -Cont. reg. diet   -F/u Cardiology consult  -Cont. tele monitoring, F/U Cardiac enzymes  -pain management prn  -DVT prophylaxis: Xarelto and IPCs  -PT, OOB, Ambulating, encourage incentive spirometer  -continue local wound care, TERRI dressing care-- to be changed upon discharge  -Continue medical management/supportive care  -Cont. medical follow up  -f/u AM labs  -D/C planning once cleared from cardiology and medicine  -will discuss pt. with surgical attending

## 2017-11-14 NOTE — OCCUPATIONAL THERAPY INITIAL EVALUATION ADULT - ANTICIPATED DISCHARGE DISPOSITION, OT EVAL
with 24 hours supervision with referral   for OT service and that [facility]  assess pt's bathroom  for appropriate shower chair/home w/ OT

## 2017-11-14 NOTE — DISCHARGE NOTE ADULT - CARE PROVIDER_API CALL
Ziggy Lyons), Orthopaedic Surgery  [address] [place] [address]  Phone: [phone]  Fax: [phone]

## 2017-11-14 NOTE — OCCUPATIONAL THERAPY INITIAL EVALUATION ADULT - ADDITIONAL COMMENTS
Prior to admission, pt was functioning in his  roles, self sufficient , driving & ambulating independently without any assistive devices. Presently, pt needs assistance with lower body self care tasks  and functional  mobility. The scale below depicts a picture of the pt's current level of functioning. Barthel Index: Feeding Score____10__, Bathing Score_0_____, Grooming Score___5__, Dressing Score__5___, Bowel Score___5__, Bladder Score___5___, Toilet Score__5___, Transfer Score___10_, Mobility Score__10___, Stairs Score___5__, Total Score____60/100_.

## 2017-11-14 NOTE — DISCHARGE NOTE ADULT - CARE PLAN
Principal Discharge DX:	Primary osteoarthritis of left hip  Goal:	recover from surgery  Instructions for follow-up, activity and diet:	Please call the office of Dr. Lyons to make an appointment for follow up in 7-10 days. Keep dressing clean and dry. TERRI dressing to be removed in office on follow up. May use stool softener for constipation. Please use prescribed pain medication as directed, and do not drive while using narcotic pain medication. Cont. xarelto 10mg daily. Please call MD if any issues.  Secondary Diagnosis:	Acquired hypothyroidism  Instructions for follow-up, activity and diet:	cont. home med and follow up with PMD  Secondary Diagnosis:	Coronary artery disease due to lipid rich plaque  Instructions for follow-up, activity and diet:	cont. home meds and follow up with PMD  Secondary Diagnosis:	Essential hypertension  Instructions for follow-up, activity and diet:	cont. home med, follow up with PMD  Secondary Diagnosis:	Hyperlipidemia  Instructions for follow-up, activity and diet:	cont. home med, follow up with PMD

## 2017-11-14 NOTE — OCCUPATIONAL THERAPY INITIAL EVALUATION ADULT - SOCIAL CONCERNS
Pt voiced concerns  about  his recovery at  home . he has the support  of his wife as needed to assist  him with ADL/Complex psychosocial needs/coping issues

## 2017-11-14 NOTE — PROGRESS NOTE ADULT - PROBLEM SELECTOR PLAN 1
s/p left THR (POD 1)  management as per ortho  pain management and bowel regimen  zofran for N/V  incentive spirometer  H/H slightly lower, but stable  OT/PT

## 2017-11-14 NOTE — DISCHARGE NOTE ADULT - MEDICATION SUMMARY - MEDICATIONS TO TAKE
I will START or STAY ON the medications listed below when I get home from the hospital:    oxyCODONE-acetaminophen 5 mg-325 mg oral tablet  -- 1 tab(s) by mouth every 6 hours, As Needed -for moderate pain MDD:4   -- Caution federal law prohibits the transfer of this drug to any person other  than the person for whom it was prescribed.  May cause drowsiness.  Alcohol may intensify this effect.  Use care when operating dangerous machinery.  This prescription cannot be refilled.  This product contains acetaminophen.  Do not use  with any other product containing acetaminophen to prevent possible liver damage.  Using more of this medication than prescribed may cause serious breathing problems.    -- Indication: For Pain    Xarelto 10 mg oral tablet  -- 1 tab(s) by mouth once a day MDD:1  -- Check with your doctor before becoming pregnant.  It is very important that you take or use this exactly as directed.  Do not skip doses or discontinue unless directed by your doctor.  Obtain medical advice before taking any non-prescription drugs as some may affect the action of this medication.  Take with food.    -- Indication: For dvt prophylaxis     Vytorin 10 mg-40 mg oral tablet  -- 1 tab(s) by mouth once a day (at bedtime)  -- Indication: For Hyperlipidemia    Lopressor 50 mg oral tablet  -- 1 tab(s) by mouth 2 times a day  -- Indication: For Hypertension    Norvasc 5 mg oral tablet  -- 1 tab(s) by mouth once a day (at bedtime)  -- Indication: For Hypertension    Colace 100 mg oral capsule  -- 1 cap(s) by mouth 2 times a day MDD:2  -- Medication should be taken with plenty of water.    -- Indication: For Constipation     Synthroid  -- 50 micrograms once a day  -- Indication: For Hypothyroid

## 2017-11-14 NOTE — CONSULT NOTE ADULT - ASSESSMENT
82yo man with a PMH of HTN and CAD s/p MI 30yrs ago that was medically managed; follows with Dr. Caro for 15yrs.  He is s/p L hip replacement yesterday; during the procedure, he was noted to have ST depressions on the monitor.  Subsequent EKG normal; Ralph negative x 3.  Pt feeling great.... denied chest pain/palpitations/dyspnea/syncope.  Nuclear stress test 4/2017 and 2D echo last week at Dr. Caro's office normal as per pt.  ST changes only noted on a monitor; 12-lead EKGs have all been normal.  Pt would like to follow with his own cardiologist if possible.  No acute cardiac issues at this time; no signs of ischemia and not in heart failure; no arrhythmias noted.  Ok to d/c home when stable, with outpt cardio follow-up.  Cont current home meds.

## 2017-11-14 NOTE — CONSULT NOTE ADULT - SUBJECTIVE AND OBJECTIVE BOX
CARDIOLOGY CONSULT NOTE    Patient is a 81y Male with a known history of :  Acquired hypothyroidism (E03.9)  Coronary artery disease due to lipid rich plaque (I25.10)  Essential hypertension (I10)  Primary osteoarthritis of left hip (M16.12)    HPI:  80yo man with a PMH of HTN and CAD s/p MI 30yrs ago that was medically managed; follows with Dr. Caro for 15yrs.  He is s/p L hip replacement yesterday; during the procedure, he was noted to have ST depressions on tele monitor.  Subsequent EKG normal; Ralph negative x 3.  Pt feeling great.... denied chest pain/palpitations/dyspnea/syncope.  Nuclear stress test 4/2017 and 2D echo last week at Dr. Caro's office normal as per pt.      REVIEW OF SYSTEMS:    CONSTITUTIONAL: No fever, weight loss, or fatigue  EYES: No eye pain, visual disturbances, or discharge  ENMT:  No difficulty hearing, tinnitus, vertigo; No sinus or throat pain  NECK: No pain or stiffness  BREASTS: No pain, masses, or nipple discharge  RESPIRATORY: No cough, wheezing, chills or hemoptysis; No shortness of breath  CARDIOVASCULAR: No chest pain, palpitations, dizziness, or leg swelling  GASTROINTESTINAL: No abdominal or epigastric pain. No nausea, vomiting, or hematemesis; No diarrhea or constipation. No melena or hematochezia.  GENITOURINARY: No dysuria, frequency, hematuria, or incontinence  NEUROLOGICAL: No headaches, memory loss, loss of strength, numbness, or tremors  SKIN: No itching, burning, rashes, or lesions   LYMPH NODES: No enlarged glands  ENDOCRINE: No heat or cold intolerance; No hair loss  MUSCULOSKELETAL: mild left hip pain  PSYCHIATRIC: No depression, anxiety, mood swings, or difficulty sleeping  HEME/LYMPH: No easy bruising, or bleeding gums  ALLERGY AND IMMUNOLOGIC: No hives or eczema    MEDICATIONS  (STANDING):  amLODIPine   Tablet 5 milliGRAM(s) Oral at bedtime  docusate sodium 100 milliGRAM(s) Oral three times a day  lactated ringers. 1000 milliLiter(s) (100 mL/Hr) IV Continuous <Continuous>  levothyroxine 50 MICROGram(s) Oral daily  metoprolol     tartrate 50 milliGRAM(s) Oral two times a day  potassium acid phosphate/sodium acid phosphate tablet (K-PHOS No. 2) 1 Tablet(s) Oral four times a day with meals  rivaroxaban 10 milliGRAM(s) Oral daily  simvastatin 40 milliGRAM(s) Oral at bedtime  sodium chloride 0.9% lock flush 3 milliLiter(s) IV Push every 8 hours    MEDICATIONS  (PRN):  acetaminophen   Tablet 650 milliGRAM(s) Oral every 6 hours PRN For Temp over 38.3 C (100.94 F)  aluminum hydroxide/magnesium hydroxide/simethicone Suspension 30 milliLiter(s) Oral four times a day PRN Indigestion  benzocaine 15 mG/menthol 3.6 mG Lozenge 1 Lozenge Oral every 3 hours PRN Sore Throat  morphine  - Injectable 2 milliGRAM(s) IV Push every 3 hours PRN Severe Pain  ondansetron Injectable 4 milliGRAM(s) IV Push every 6 hours PRN Nausea and/or Vomiting  oxyCODONE    IR 5 milliGRAM(s) Oral every 4 hours PRN Mild Pain  oxyCODONE    IR 10 milliGRAM(s) Oral every 4 hours PRN Moderate Pain  senna 2 Tablet(s) Oral at bedtime PRN Constipation      ALLERGIES: penicillin (Stomach Upset; Rash)      FAMILY HISTORY: n/c      PHYSICAL EXAMINATION:  -----------------------------  T(C): 36.7 (11-14-17 @ 11:59), Max: 36.7 (11-13-17 @ 20:27)  HR: 90 (11-14-17 @ 11:59) (84 - 107)  BP: 98/60 (11-14-17 @ 11:59) (93/40 - 135/68)  RR: 15 (11-14-17 @ 11:59) (12 - 23)  SpO2: 96% (11-14-17 @ 11:59) (94% - 98%)  Wt(kg): --    11-13 @ 07:01  -  11-14 @ 07:00  --------------------------------------------------------  IN:    IV PiggyBack: 50 mL    lactated ringers.: 1000 mL    lactated ringers.: 225 mL  Total IN: 1275 mL    OUT:    Voided: 400 mL  Total OUT: 400 mL    Total NET: 875 mL            Constitutional: well developed, normal appearance, well groomed, well nourished, no deformities and no acute distress.   Eyes: the conjunctiva exhibited no abnormalities and the eyelids demonstrated no xanthelasmas.   HEENT: normal oral mucosa, no oral pallor and no oral cyanosis.   Neck: normal jugular venous A waves present, normal jugular venous V waves present and no jugular venous delacruz A waves.   Pulmonary: no respiratory distress, normal respiratory rhythm and effort, no accessory muscle use and lungs were clear to auscultation bilaterally.   Cardiovascular: heart rate and rhythm were normal, normal S1 and S2 and no murmur, gallop, rub, heave or thrill are present.   Abdomen: soft, non-tender, no hepato-splenomegaly and no abdominal mass palpated.   Musculoskeletal: the gait could not be assessed..   Extremities: sitting in a chair; mild left hip pain with motion  Skin: normal skin color and pigmentation, no rash, no venous stasis, no skin lesions, no skin ulcer and no xanthoma was observed.   Psychiatric: oriented to person, place, and time, the affect was normal, the mood was normal and not feeling anxious.     LABS:   --------  11-14    136  |  103  |  19  ----------------------------<  162<H>  4.2   |  22  |  1.07    Ca    7.9<L>      14 Nov 2017 06:07  Phos  2.0     11-14  Mg     2.0     11-14                           10.0   10.2  )-----------( 232      ( 14 Nov 2017 06:07 )             30.0     PT/INR - ( 13 Nov 2017 17:59 )   PT: 11.4 sec;   INR: 1.04 ratio         PTT - ( 13 Nov 2017 17:59 )  PTT:26.7 sec    11-14 @ 10:47 CPK total:--, CKMB --, Troponin I - .016 ng/mL  11-14 @ 02:27 CPK total:--, CKMB --, Troponin I - .020 ng/mL  11-13 @ 17:59 CPK total:--, CKMB --, Troponin I - <.015 ng/mL          RADIOLOGY:  -----------------    ECG: NSR 89bpm; single PVC

## 2017-11-14 NOTE — OCCUPATIONAL THERAPY INITIAL EVALUATION ADULT - LIVES WITH, PROFILE
in a private house  with  11 steps to get to his bedroom and bathroom  with rail on 1 side; pt's  bathroom has a tub/ shower combination,  standard toilet  and it is not equipped with shower chair or grab bars./spouse

## 2017-11-14 NOTE — DISCHARGE NOTE ADULT - SECONDARY DIAGNOSIS.
Acquired hypothyroidism Coronary artery disease due to lipid rich plaque Essential hypertension Hyperlipidemia

## 2017-11-14 NOTE — PROGRESS NOTE ADULT - SUBJECTIVE AND OBJECTIVE BOX
Patient is a 81y old  Male who presents with a chief complaint of I have left hip pain (14 Nov 2017 04:39)      INTERVAL HPI/OVERNIGHT EVENTS:  some hip pain after therapy but does not want to take pain meds. able to participate in therapy. no other complaints, no n/v, tolerating po, +flatus, +urination    MEDICATIONS  (STANDING):  amLODIPine   Tablet 5 milliGRAM(s) Oral at bedtime  docusate sodium 100 milliGRAM(s) Oral three times a day  lactated ringers. 1000 milliLiter(s) (100 mL/Hr) IV Continuous <Continuous>  levothyroxine 50 MICROGram(s) Oral daily  metoprolol     tartrate 50 milliGRAM(s) Oral two times a day  potassium acid phosphate/sodium acid phosphate tablet (K-PHOS No. 2) 1 Tablet(s) Oral four times a day with meals  rivaroxaban 10 milliGRAM(s) Oral daily  simvastatin 40 milliGRAM(s) Oral at bedtime  sodium chloride 0.9% lock flush 3 milliLiter(s) IV Push every 8 hours    MEDICATIONS  (PRN):  acetaminophen   Tablet 650 milliGRAM(s) Oral every 6 hours PRN For Temp over 38.3 C (100.94 F)  aluminum hydroxide/magnesium hydroxide/simethicone Suspension 30 milliLiter(s) Oral four times a day PRN Indigestion  benzocaine 15 mG/menthol 3.6 mG Lozenge 1 Lozenge Oral every 3 hours PRN Sore Throat  morphine  - Injectable 2 milliGRAM(s) IV Push every 3 hours PRN Severe Pain  ondansetron Injectable 4 milliGRAM(s) IV Push every 6 hours PRN Nausea and/or Vomiting  oxyCODONE    IR 5 milliGRAM(s) Oral every 4 hours PRN Mild Pain  oxyCODONE    IR 10 milliGRAM(s) Oral every 4 hours PRN Moderate Pain  senna 2 Tablet(s) Oral at bedtime PRN Constipation      Allergies    penicillin (Stomach Upset; Rash)    Intolerances        REVIEW OF SYSTEMS:  CONSTITUTIONAL: No fever, weight loss, or fatigue  EYES: No eye pain, visual disturbances, or discharge  RESPIRATORY: No cough, wheezing, chills or hemoptysis; No shortness of breath  CARDIOVASCULAR: No chest pain, palpitations, dizziness, or leg swelling  GASTROINTESTINAL: No abdominal or epigastric pain. No nausea, vomiting, or hematemesis; No diarrhea or constipation. No melena or hematochezia, +flatus  GENITOURINARY: No dysuria, frequency, hematuria, or incontinence  NEUROLOGICAL: No headaches, memory loss, loss of strength, numbness, or tremors  SKIN: No itching, burning, rashes, or lesions   MUSCULOSKELETAL: No joint pain or swelling; No muscle, back, +hip pain  PSYCHIATRIC: No depression, anxiety, mood swings, or difficulty sleeping  HEME/LYMPH: No easy bruising, or bleeding gums  ALLERGY AND IMMUNOLOGIC: No hives or eczema    Vital Signs Last 24 Hrs  T(C): 36.7 (14 Nov 2017 11:59), Max: 36.7 (13 Nov 2017 20:27)  T(F): 98.1 (14 Nov 2017 11:59), Max: 98.1 (14 Nov 2017 11:59)  HR: 111 (14 Nov 2017 13:25) (84 - 111)  BP: 111/73 (14 Nov 2017 13:25) (93/40 - 135/68)  BP(mean): 69 (14 Nov 2017 10:45) (69 - 69)  RR: 15 (14 Nov 2017 11:59) (12 - 23)  SpO2: 96% (14 Nov 2017 13:25) (94% - 98%)    PHYSICAL EXAM:  GENERAL: NAD, well-groomed, well-developed  HEAD:  Atraumatic, Normocephalic  EYES: EOMI, PERRLA, conjunctiva and sclera clear  ENMT: No tonsillar erythema, exudates, or enlargement; Moist mucous membranes, Good dentition, No lesions  NECK: Supple, No JVD, Normal thyroid  NERVOUS SYSTEM:  Alert & Oriented X3, Good concentration; Motor Strength 5/5 B/L upper and lower extremities; DTRs 2+ intact and symmetric  CHEST/LUNG: Clear to percussion bilaterally; No rales, rhonchi, wheezing, or rubs  HEART: Regular rate and rhythm; No murmurs, rubs, or gallops  ABDOMEN: Soft, Nontender, Nondistended; Bowel sounds present  EXTREMITIES:  2+ Peripheral Pulses, No clubbing, cyanosis, or edema, left hip dressing c/d/i      LABS:                        10.0   10.2  )-----------( 232      ( 14 Nov 2017 06:07 )             30.0     11-14    136  |  103  |  19  ----------------------------<  162<H>  4.2   |  22  |  1.07    Ca    7.9<L>      14 Nov 2017 06:07  Phos  2.0     11-14  Mg     2.0     11-14      PT/INR - ( 13 Nov 2017 17:59 )   PT: 11.4 sec;   INR: 1.04 ratio         PTT - ( 13 Nov 2017 17:59 )  PTT:26.7 sec    CAPILLARY BLOOD GLUCOSE          RADIOLOGY & ADDITIONAL TESTS:    Imaging Personally Reviewed:  [ x] YES  [ ] NO    Consultant(s) Notes Reviewed:  [ x] YES  [ ] NO    Care Discussed with Consultants/Other Providers [x ] YES  [ ] NO

## 2017-11-16 LAB — SURGICAL PATHOLOGY FINAL REPORT - CH: SIGNIFICANT CHANGE UP

## 2017-11-29 DIAGNOSIS — M16.12 UNILATERAL PRIMARY OSTEOARTHRITIS, LEFT HIP: ICD-10-CM

## 2017-11-29 DIAGNOSIS — Z96.641 PRESENCE OF RIGHT ARTIFICIAL HIP JOINT: ICD-10-CM

## 2017-11-29 DIAGNOSIS — I10 ESSENTIAL (PRIMARY) HYPERTENSION: ICD-10-CM

## 2017-11-29 DIAGNOSIS — Z88.0 ALLERGY STATUS TO PENICILLIN: ICD-10-CM

## 2017-11-29 DIAGNOSIS — I25.2 OLD MYOCARDIAL INFARCTION: ICD-10-CM

## 2017-11-29 DIAGNOSIS — E03.9 HYPOTHYROIDISM, UNSPECIFIED: ICD-10-CM

## 2017-11-29 DIAGNOSIS — I25.10 ATHEROSCLEROTIC HEART DISEASE OF NATIVE CORONARY ARTERY WITHOUT ANGINA PECTORIS: ICD-10-CM

## 2018-05-16 NOTE — PROGRESS NOTE ADULT - PROBLEM/PLAN-1
How Severe Is Your Rash?: moderate
Is This A New Presentation, Or A Follow-Up?: Rash
DISPLAY PLAN FREE TEXT
DISPLAY PLAN FREE TEXT

## 2018-12-18 NOTE — OCCUPATIONAL THERAPY INITIAL EVALUATION ADULT - WEIGHT-BEARING RESTRICTIONS: SIT/STAND, REHAB EVAL
full weight-bearing Complex Repair And Rotation Flap Text: The defect edges were debeveled with a #15 scalpel blade.  The primary defect was closed partially with a complex linear closure.  Given the location of the remaining defect, shape of the defect and the proximity to free margins a rotation flap was deemed most appropriate for complete closure of the defect.  Using a sterile surgical marker, an appropriate advancement flap was drawn incorporating the defect and placing the expected incisions within the relaxed skin tension lines where possible.    The area thus outlined was incised deep to adipose tissue with a #15 scalpel blade.  The skin margins were undermined to an appropriate distance in all directions utilizing iris scissors.

## 2020-10-01 NOTE — ASU PREOP CHECKLIST - ALLERGIES REVIEWED
What Type Of Note Output Would You Prefer (Optional)?: Bullet Format
How Severe Is Your Skin Lesion?: moderate
Has Your Skin Lesion Been Treated?: not been treated
Is This A New Presentation, Or A Follow-Up?: Skin Lesions
done

## 2023-01-01 NOTE — OCCUPATIONAL THERAPY INITIAL EVALUATION ADULT - LONG TERM MEMORY, REHAB EVAL
[id number]  Pt has PICC that is clotted, IV team looked at PICC and wants to speak with MD. Please call [phone].  Thanks [place]io BERUMEN RN *[phone]   intact

## 2023-06-02 NOTE — H&P PST ADULT - VENOUS THROMBOEMBOLISM
Stacia Swenson called requesting a refill of the below medication which has been pended for you:     Requested Prescriptions     Pending Prescriptions Disp Refills    oxyCODONE-acetaminophen (PERCOCET) 5-325 MG per tablet 60 tablet 0     Sig: Take 1 tablet by mouth 2 times daily as needed for Pain for up to 30 days. Max Daily Amount: 2 tablets       Last Appointment Date: 1/4/2023  Next Appointment Date: Visit date not found    No Known Allergies    Pharmacy:  [facility]    OAS Report checked for [place], and [place]:Percocet 5/325  04/21/23 #60. Due NOW. no